# Patient Record
Sex: MALE | Race: BLACK OR AFRICAN AMERICAN | NOT HISPANIC OR LATINO | Employment: FULL TIME | URBAN - METROPOLITAN AREA
[De-identification: names, ages, dates, MRNs, and addresses within clinical notes are randomized per-mention and may not be internally consistent; named-entity substitution may affect disease eponyms.]

---

## 2017-11-03 ENCOUNTER — HOSPITAL ENCOUNTER (EMERGENCY)
Facility: HOSPITAL | Age: 37
Discharge: HOME | End: 2017-11-03
Attending: EMERGENCY MEDICINE
Payer: MEDICARE

## 2017-11-03 VITALS
SYSTOLIC BLOOD PRESSURE: 144 MMHG | RESPIRATION RATE: 18 BRPM | TEMPERATURE: 98 F | DIASTOLIC BLOOD PRESSURE: 85 MMHG | WEIGHT: 149 LBS | BODY MASS INDEX: 22.07 KG/M2 | HEIGHT: 69 IN | HEART RATE: 60 BPM | OXYGEN SATURATION: 100 %

## 2017-11-03 DIAGNOSIS — R07.89 CHEST WALL PAIN: Primary | ICD-10-CM

## 2017-11-03 DIAGNOSIS — R07.9 CHEST PAIN: ICD-10-CM

## 2017-11-03 PROCEDURE — 99284 EMERGENCY DEPT VISIT MOD MDM: CPT | Mod: 25

## 2017-11-03 PROCEDURE — 93010 ELECTROCARDIOGRAM REPORT: CPT | Mod: ,,, | Performed by: INTERNAL MEDICINE

## 2017-11-03 PROCEDURE — 93005 ELECTROCARDIOGRAM TRACING: CPT

## 2017-11-03 PROCEDURE — 99285 EMERGENCY DEPT VISIT HI MDM: CPT | Mod: GC,,, | Performed by: EMERGENCY MEDICINE

## 2017-11-03 PROCEDURE — 25000003 PHARM REV CODE 250: Performed by: EMERGENCY MEDICINE

## 2017-11-03 RX ORDER — NAPROXEN 500 MG/1
500 TABLET ORAL 2 TIMES DAILY WITH MEALS
Qty: 30 TABLET | Refills: 0 | Status: SHIPPED | OUTPATIENT
Start: 2017-11-03

## 2017-11-03 RX ORDER — NAPROXEN 500 MG/1
500 TABLET ORAL
Status: COMPLETED | OUTPATIENT
Start: 2017-11-03 | End: 2017-11-03

## 2017-11-03 RX ORDER — KETOROLAC TROMETHAMINE 30 MG/ML
10 INJECTION, SOLUTION INTRAMUSCULAR; INTRAVENOUS
Status: DISCONTINUED | OUTPATIENT
Start: 2017-11-03 | End: 2017-11-03

## 2017-11-03 RX ADMIN — NAPROXEN 500 MG: 500 TABLET ORAL at 11:11

## 2017-11-04 NOTE — ED NOTES
Patient identifiers verified and correct for Mr Jimenez  C/C: CP  APPEARANCE: awake and alert in NAD.  SKIN: warm, dry and intact. No breakdown or bruising.  MUSCULOSKELETAL: Patient moving all extremities spontaneously, no obvious swelling or deformities noted. Ambulates independently.  RESPIRATORY: Positive shortness of breath with deep breath..Respirations unlabored. No cough  CARDIAC: Denies CP, 2+ distal pulses; no peripheral edema  ABDOMEN: S/ND/NT, Denies nausea  : voids spontaneously, denies difficulty  Neurologic: AAO x 4; follows commands equal strength in all extremities; denies numbness/tingling. Denies dizziness

## 2017-11-04 NOTE — ED PROVIDER NOTES
Encounter Date: 11/3/2017    SCRIBE #1 NOTE: I, Soumya Alejandre, am scribing for, and in the presence of,  Dr. Guillen. I have scribed the following portions of the note - the EKG reading and the Resident attestation.       History     Chief Complaint   Patient presents with    Chest Pain     Pt reports chest pain since this AM, states it feels like something hit him in his chest. Slight SOB.     HPI   Pt had chest pain since about 10am this AM.  He was at work when it began.  He works doing demolition.  The chest pain is just to the left of the sternum.  The pain doesn't radiate anywhere.  It is reproducible when he pushes on it.  Denies fever, chills, N/V/D, trauma, abd pain, dysuria, flank pain, HA.    Has never had this before.  Thinks it may be gas or muscle strain.  He denies history of exertional angina.  Denies hx DVT or PE.  Denies recent cough, fever chills.      Family history of MI - her mother had an MI when she was 52.  No one else with heart disease.      Review of patient's allergies indicates:   Allergen Reactions    Penicillins      Past Medical History:   Diagnosis Date    Seizure      History reviewed. No pertinent surgical history.  History reviewed. No pertinent family history.  Social History   Substance Use Topics    Smoking status: Never Smoker    Smokeless tobacco: Never Used    Alcohol use No     Review of Systems   Cardiovascular: Positive for chest pain.   All other systems reviewed and are negative.      Physical Exam     Initial Vitals [11/03/17 2006]   BP Pulse Resp Temp SpO2   133/87 69 16 98.6 °F (37 °C) 99 %      MAP       102.33         Physical Exam  Gen/Constitutional: Interactive. No acute distress  Head: Normocephalic, Atraumatic  Neck: supple, no masses or LAD  Eyes: PERRLA, conjunctiva clear  Ears, Nose and Throat: No rhinorrhea or stridor.  Cardiac: Reg Rhythm, No murmur, left sided chest wall pain that is reproducible to palpation  Pulmonary: CTA Bilat, no wheezes,  rhonchi, rales.  GI: Abdomen soft, non-tender, non-distended; no rebound or guarding  : No CVA tenderness.  Musculoskeletal: Extremities warm, well perfused, no erythema, no edema  Skin: No rashes  Neuro: Alert and Oriented x 3; No focal motor or sensory deficits.    Psych: Normal affect    ED Course   Procedures  Labs Reviewed - No data to display     EKG Readings: (Independently Interpreted)   EKG: NSR, no ALCIRA's or STD's, non-specific twave pattern, no STEMI. Early repolarization pattern.             Medical Decision Making:   Independently Interpreted Test(s):   I have ordered and independently interpreted X-rays - see prior notes.  Clinical Tests:   Medical Tests: Ordered and Reviewed            Scribe Attestation:   Scribe #1: I performed the above scribed service and the documentation accurately describes the services I performed. I attest to the accuracy of the note.    Attending Attestation:   Physician Attestation Statement for Resident:  As the supervising MD   Physician Attestation Statement: I have personally seen and examined this patient.   I agree with the above history. -: Presentation consistent with noncardiac CP. Considered MI/ACS, PE, PTX, aortic dissection, and PNA among other diagnoses but ultimately felt these were unlikely based on hx and physical exam. Ultimately felt pt had chest wall pain. Given naproxen. Stable for discharge and outpatient follow up with PCP for cardiac risk stratification. The patient was given strict return precautions and follow up instructions and expressed understanding of these.  The patient felt comfortable with the plan for discharge and I did as well.  Stable for DC.       As the supervising MD I agree with the above PE.    As the supervising MD I agree with the above treatment, course, plan, and disposition.  I have reviewed and agree with the residents interpretation of the following: EKG.               Differential diagnosis includes but not limited to chest  wall pain due to musculoskeletal cause as reproducible on chest wall palpation. Low suspicion for MI, PE. Given naproxen here and prescription for it. Encouraged follow up with PCP. Discharged home with return precautions.  Charu Kennedy MD, PGY-4  11:21 PM    I, Dr. Abhishek Guillen, personally performed the services described in this documentation. All medical record entries made by the scribe were at my direction and in my presence.  I have reviewed the chart and agree that the record reflects my personal performance and is accurate and complete. Abhishek Guillen MD.  7:27 AM 11/04/2017            ED Course      Clinical Impression:   The primary encounter diagnosis was Chest wall pain. A diagnosis of Chest pain was also pertinent to this visit.    Disposition:   Disposition: Discharged  Condition: Stable                        Abhishek Guillen MD  11/04/17 0727

## 2019-03-22 ENCOUNTER — HOSPITAL ENCOUNTER (EMERGENCY)
Facility: HOSPITAL | Age: 39
Discharge: HOME | End: 2019-03-23
Attending: EMERGENCY MEDICINE
Payer: MEDICARE

## 2019-03-22 DIAGNOSIS — N30.90 CYSTITIS: Primary | ICD-10-CM

## 2019-03-22 DIAGNOSIS — R31.0 GROSS HEMATURIA: ICD-10-CM

## 2019-03-22 LAB
BACTERIA #/AREA URNS AUTO: ABNORMAL /HPF
BILIRUB UR QL STRIP: NEGATIVE
CLARITY UR REFRACT.AUTO: CLEAR
COLOR UR AUTO: YELLOW
GLUCOSE UR QL STRIP: NEGATIVE
HGB UR QL STRIP: ABNORMAL
KETONES UR QL STRIP: NEGATIVE
LEUKOCYTE ESTERASE UR QL STRIP: ABNORMAL
MICROSCOPIC COMMENT: ABNORMAL
NITRITE UR QL STRIP: NEGATIVE
PH UR STRIP: 5 [PH] (ref 5–8)
PROT UR QL STRIP: NEGATIVE
RBC #/AREA URNS AUTO: >100 /HPF (ref 0–4)
SP GR UR STRIP: 1.02 (ref 1–1.03)
SQUAMOUS #/AREA URNS AUTO: 1 /HPF
URN SPEC COLLECT METH UR: ABNORMAL
WBC #/AREA URNS AUTO: 29 /HPF (ref 0–5)

## 2019-03-22 PROCEDURE — 87491 CHLMYD TRACH DNA AMP PROBE: CPT

## 2019-03-22 PROCEDURE — 85025 COMPLETE CBC W/AUTO DIFF WBC: CPT

## 2019-03-22 PROCEDURE — 87086 URINE CULTURE/COLONY COUNT: CPT

## 2019-03-22 PROCEDURE — 99284 PR EMERGENCY DEPT VISIT,LEVEL IV: ICD-10-PCS | Mod: ,,, | Performed by: EMERGENCY MEDICINE

## 2019-03-22 PROCEDURE — 81001 URINALYSIS AUTO W/SCOPE: CPT

## 2019-03-22 PROCEDURE — 99284 EMERGENCY DEPT VISIT MOD MDM: CPT

## 2019-03-22 PROCEDURE — 80048 BASIC METABOLIC PNL TOTAL CA: CPT

## 2019-03-22 PROCEDURE — 99284 EMERGENCY DEPT VISIT MOD MDM: CPT | Mod: ,,, | Performed by: EMERGENCY MEDICINE

## 2019-03-23 VITALS
SYSTOLIC BLOOD PRESSURE: 129 MMHG | TEMPERATURE: 98 F | DIASTOLIC BLOOD PRESSURE: 78 MMHG | RESPIRATION RATE: 20 BRPM | HEART RATE: 70 BPM | OXYGEN SATURATION: 98 %

## 2019-03-23 LAB
ANION GAP SERPL CALC-SCNC: 10 MMOL/L
BASOPHILS # BLD AUTO: 0.04 K/UL
BASOPHILS NFR BLD: 0.6 %
BUN SERPL-MCNC: 18 MG/DL
CALCIUM SERPL-MCNC: 9.5 MG/DL
CHLORIDE SERPL-SCNC: 105 MMOL/L
CO2 SERPL-SCNC: 25 MMOL/L
CREAT SERPL-MCNC: 1.1 MG/DL
DIFFERENTIAL METHOD: NORMAL
EOSINOPHIL # BLD AUTO: 0.1 K/UL
EOSINOPHIL NFR BLD: 1.8 %
ERYTHROCYTE [DISTWIDTH] IN BLOOD BY AUTOMATED COUNT: 12.6 %
EST. GFR  (AFRICAN AMERICAN): >60 ML/MIN/1.73 M^2
EST. GFR  (NON AFRICAN AMERICAN): >60 ML/MIN/1.73 M^2
GLUCOSE SERPL-MCNC: 76 MG/DL
HCT VFR BLD AUTO: 42.7 %
HGB BLD-MCNC: 14.3 G/DL
IMM GRANULOCYTES # BLD AUTO: 0.01 K/UL
IMM GRANULOCYTES NFR BLD AUTO: 0.2 %
LYMPHOCYTES # BLD AUTO: 2.4 K/UL
LYMPHOCYTES NFR BLD: 38.7 %
MCH RBC QN AUTO: 30.3 PG
MCHC RBC AUTO-ENTMCNC: 33.5 G/DL
MCV RBC AUTO: 91 FL
MONOCYTES # BLD AUTO: 0.4 K/UL
MONOCYTES NFR BLD: 7.1 %
NEUTROPHILS # BLD AUTO: 3.2 K/UL
NEUTROPHILS NFR BLD: 51.6 %
NRBC BLD-RTO: 0 /100 WBC
PLATELET # BLD AUTO: 308 K/UL
PMV BLD AUTO: 9.7 FL
POTASSIUM SERPL-SCNC: 4.2 MMOL/L
RBC # BLD AUTO: 4.72 M/UL
SODIUM SERPL-SCNC: 140 MMOL/L
WBC # BLD AUTO: 6.2 K/UL

## 2019-03-23 RX ORDER — SULFAMETHOXAZOLE AND TRIMETHOPRIM 800; 160 MG/1; MG/1
1 TABLET ORAL 2 TIMES DAILY
Qty: 14 TABLET | Refills: 0 | Status: SHIPPED | OUTPATIENT
Start: 2019-03-23 | End: 2019-03-30

## 2019-03-23 NOTE — ED TRIAGE NOTES
Patient presents to the ED with complaints of visible blood in his urine, burning with urination and lower abdominal pain x 1 day. Patient denies any penile discharge. Patient denies any nausea, fevers, chills.

## 2019-03-23 NOTE — DISCHARGE INSTRUCTIONS
Take your antibiotic as prescribed.  Follow up with your doctor. Call on Monday for an appointment in 2 weeks.  Return to ED for fevers, abdominal or flank pain, vomiting, lightheadedness or any other concerns.

## 2019-03-23 NOTE — ED PROVIDER NOTES
Encounter Date: 3/22/2019    SCRIBE #1 NOTE: I, Elishasusan Wallace, am scribing for, and in the presence of,  Dr. Swain. I have scribed the entire note.       History     Chief Complaint   Patient presents with    Hematuria     c/o hematuria and dysuria x 1 day.      Time patient was seen by the provider: 10:54 PM      The patient is a 38 y.o. male with no signfiicant co-morbidities  who presents to the ED with a complaint of hematuria, dysuria, and suprapubic abd pain for one day. He noted bright red blood in the toilet after urinating this afternoon. He is not sexually active, has had no anal penetration, and has no history of STDs. Denies flank pain, fevers, nausea, emesis, diarrhea, hematochezia, CP, SOB, HA, or recent trauma/injury to the area. No penile drip.        The history is provided by the patient.     Review of patient's allergies indicates:   Allergen Reactions    Penicillins      Past Medical History:   Diagnosis Date    Seizure      History reviewed. No pertinent surgical history.  History reviewed. No pertinent family history.  Social History     Tobacco Use    Smoking status: Never Smoker    Smokeless tobacco: Never Used   Substance Use Topics    Alcohol use: No    Drug use: No     Review of Systems   Constitutional: Negative for fever.   HENT: Negative for nosebleeds.    Eyes: Negative for visual disturbance.   Respiratory: Negative for shortness of breath.    Cardiovascular: Negative for chest pain.   Gastrointestinal: Positive for abdominal pain (suprapubic ). Negative for blood in stool, constipation, nausea and vomiting.   Genitourinary: Positive for dysuria and hematuria. Negative for flank pain.   Musculoskeletal: Negative for gait problem.   Skin: Negative for wound.   Neurological: Negative for headaches.   Hematological: Does not bruise/bleed easily.   Psychiatric/Behavioral: Negative for confusion.       Physical Exam     Initial Vitals [03/22/19 2150]   BP Pulse Resp Temp SpO2    134/80 69 18 97.9 °F (36.6 °C) 99 %      MAP       --         Physical Exam    Nursing note and vitals reviewed.  Constitutional: He appears well-developed and well-nourished.   HENT:   Head: Normocephalic and atraumatic.   Eyes: EOM are normal.   Neck: Normal range of motion. Neck supple.   Cardiovascular: Normal rate, regular rhythm and normal heart sounds. Exam reveals no gallop and no friction rub.    No murmur heard.  Pulmonary/Chest: Breath sounds normal. No respiratory distress.   Abdominal: Soft. He exhibits no distension. There is no tenderness.   Genitourinary:   Genitourinary Comments: Normal external male genitalia. Uncircumcised. Retraction of foreskin revealed normal ureteral meatus with minimal amount of blood at meatus. No penile lesions, no inguinal lymphadenopathy.    Musculoskeletal: He exhibits no edema or tenderness.   Neurological: He is alert and oriented to person, place, and time. He has normal strength. GCS score is 15. GCS eye subscore is 4. GCS verbal subscore is 5. GCS motor subscore is 6.   Skin: Skin is warm and dry.         ED Course   Procedures  Labs Reviewed   URINALYSIS, REFLEX TO URINE CULTURE - Abnormal; Notable for the following components:       Result Value    Occult Blood UA 3+ (*)     Leukocytes, UA 1+ (*)     All other components within normal limits    Narrative:     YELLOW AND GRAY TUBES  Preferred Collection Type->Urine, Clean Catch   URINALYSIS MICROSCOPIC - Abnormal; Notable for the following components:    RBC, UA >100 (*)     WBC, UA 29 (*)     All other components within normal limits    Narrative:     YELLOW AND GRAY TUBES  Preferred Collection Type->Urine, Clean Catch   C. TRACHOMATIS/N. GONORRHOEAE BY AMP DNA   CULTURE, URINE   CBC W/ AUTO DIFFERENTIAL   BASIC METABOLIC PANEL          Imaging Results          CT Renal Stone Study ABD Pelvis WO (Final result)  Result time 03/23/19 01:08:31    Final result by Gilberto Montenegro MD (03/23/19 01:08:31)                  Impression:      No nephrolithiasis or obstructive uropathy.    Mild diffuse bladder wall thickening which is nonspecific may be related to cystitis or recently passed stone in the setting of hematuria.  Recommend clinical correlation with urinalysis.  Consider urology referral if hematuria persists.    Electronically signed by resident: Corey Villasenor  Date:    03/23/2019  Time:    00:39    Electronically signed by: Gilberto Montenegro MD  Date:    03/23/2019  Time:    01:08             Narrative:    EXAMINATION:  CT RENAL STONE STUDY ABD PELVIS WO    CLINICAL HISTORY:  Hematuria;    TECHNIQUE:  The patient was surveyed from the lung bases through the pelvis without the administration of contrast.  The data was reconstructed for coronal, sagittal, and axial images.    COMPARISON:  None.    FINDINGS:  The lung bases are clear.  No pleural effusion is seen.    The visualized portions of the heart appear normal. No pericardial effusion.    The liver is normal in size and attenuation.  No focal hepatic abnormality is seen. The gallbladder is unremarkable.  No intrahepatic or extrahepatic biliary ductal dilatation is identified. The spleen is unremarkable.    The stomach, pancreas, and adrenal glands are unremarkable.    The kidneys are normal in size and location. No hydronephrosis is seen.  The ureters appear normal in course and caliber. The urinary bladder demonstrates mild diffuse wall thickening.  The prostate is normal.  Few pelvic phleboliths noted.    The visualized loops of small and large bowel show no evidence of obstruction or inflammation.  The appendix is normal.    No ascites, free fluid, or intraperitoneal free air is identified.  No mesenteric, retroperitoneal, or inguinal lymph node enlargement.  The abdominal aorta is normal in course and caliber without significant atherosclerotic calcifications.    The osseous structures demonstrate no significant abnormality.   The extraperitoneal soft tissues  are unremarkable.                                 Medical Decision Making:   History:   Old Medical Records: I decided to obtain old medical records.  Old Records Summarized: records from previous admission(s).  Initial Assessment:   39 y/o M with gross hematuria. No flank pain. No trauma. No penile discharge.  Ddx: cystitis, UTI, STI, less likely kidney stone, bladder mass  Routine blood work as well as UA  GC/Ch- will not treat presumptively  CT renal stone protocal  Clinical Tests:   Lab Tests: Ordered and Reviewed  Radiological Study: Ordered and Reviewed  ED Management:  1:13 AM  UA >100 rbc, 29 wbc, 1+LE and rare bacteria  Normal wbc and hgb  CTscan  No nephrolithiasis or obstructive uropathy. Mild diffuse bladder wall thickening which is nonspecific may be related to cystitis or recently passed stone in the setting of hematuria.    Will treat as UTI and have pt follow up with PCP for repeat UA in 2 weeks. If hematuria persists referral to urology.  Pt is to be called if GC/Ch are positive.  Routine return precautions provided.              Scribe Attestation:   Scribe #1: I performed the above scribed service and the documentation accurately describes the services I performed. I attest to the accuracy of the note.               Clinical Impression:       ICD-10-CM ICD-9-CM   1. Cystitis N30.90 595.9   2. Gross hematuria R31.0 599.71         Disposition:   Disposition: Discharged  Condition: Stable                        Yadi Swain MD  03/23/19 3718

## 2019-03-23 NOTE — ED NOTES
Patient identifiers verified and correct for Kevin Jimenez.  C/C: Hematuria  APPEARANCE: awake and alert in NAD.  SKIN: warm, dry and intact. No breakdown or bruising.  MUSCULOSKELETAL: Patient moving all extremities spontaneously, no obvious swelling or deformities noted. Ambulates independently.  RESPIRATORY: Denies shortness of breath. Respirations unlabored.   CARDIAC: Denies CP, 2+ distal pulses; no peripheral edema  ABDOMEN: S/ND. Denies nausea.  : voids spontaneously. +Hematuria +Burning with urination  Neurologic: AAO x 4; follows commands equal strength in all extremities; denies numbness/tingling. Denies dizziness or weakness.

## 2019-03-24 LAB — BACTERIA UR CULT: NORMAL

## 2019-03-25 LAB
C TRACH DNA SPEC QL NAA+PROBE: NOT DETECTED
N GONORRHOEA DNA SPEC QL NAA+PROBE: NOT DETECTED

## 2023-12-27 ENCOUNTER — HOSPITAL ENCOUNTER (EMERGENCY)
Facility: HOSPITAL | Age: 43
Discharge: HOME | End: 2023-12-27
Attending: EMERGENCY MEDICINE
Payer: MEDICARE

## 2023-12-27 VITALS
TEMPERATURE: 100 F | BODY MASS INDEX: 28.41 KG/M2 | RESPIRATION RATE: 16 BRPM | HEART RATE: 89 BPM | DIASTOLIC BLOOD PRESSURE: 66 MMHG | OXYGEN SATURATION: 100 % | SYSTOLIC BLOOD PRESSURE: 123 MMHG | HEIGHT: 69 IN | WEIGHT: 191.81 LBS

## 2023-12-27 DIAGNOSIS — J11.1 INFLUENZA: Primary | ICD-10-CM

## 2023-12-27 DIAGNOSIS — R05.9 COUGH: ICD-10-CM

## 2023-12-27 LAB
INFLUENZA A, MOLECULAR: NEGATIVE
INFLUENZA B, MOLECULAR: POSITIVE
SARS-COV-2 RDRP RESP QL NAA+PROBE: NEGATIVE
SPECIMEN SOURCE: ABNORMAL

## 2023-12-27 PROCEDURE — U0002 COVID-19 LAB TEST NON-CDC: HCPCS

## 2023-12-27 PROCEDURE — 99283 EMERGENCY DEPT VISIT LOW MDM: CPT | Mod: 25

## 2023-12-27 PROCEDURE — 87502 INFLUENZA DNA AMP PROBE: CPT

## 2023-12-27 PROCEDURE — 25000003 PHARM REV CODE 250

## 2023-12-27 RX ORDER — ACETAMINOPHEN 325 MG/1
650 TABLET ORAL
Status: COMPLETED | OUTPATIENT
Start: 2023-12-27 | End: 2023-12-27

## 2023-12-27 RX ADMIN — ACETAMINOPHEN 650 MG: 325 TABLET ORAL at 12:12

## 2023-12-27 NOTE — DISCHARGE INSTRUCTIONS
You tested positive for influenza B  Antibiotics are not effective against viruses.  Continue symptom management with Tylenol or TheraFlu.  [place] diet.  Increase hydration.  Wear mask around others.

## 2023-12-27 NOTE — ED TRIAGE NOTES
Pt arrives to ED via PV stating he has had flu like symptoms since Sunday. Pt states he has had a headache, chills, fever, and cough. Pt states he has been taking tylenol for the fever.

## 2023-12-27 NOTE — ED PROVIDER NOTES
Encounter Date: 12/27/2023       History     Chief Complaint   Patient presents with    flu symptoms     Body aches, ha,      43-year-old male no significant medical history presents to emergency department due to fevers, myalgias and nonproductive cough since Sunday.  Patient reports generally feeling unwell.  He has been taking DayQuil and Tylenol for fevers at home.  He was not experiencing nausea or vomiting. No diarrhea.      Review of patient's allergies indicates:   Allergen Reactions    Penicillins      Past Medical History:   Diagnosis Date    Seizure      No past surgical history on file.  No family history on file.  Social History     Tobacco Use    Smoking status: Never    Smokeless tobacco: Never   Substance Use Topics    Alcohol use: No    Drug use: No     Review of Systems  See HPI  Physical Exam     Initial Vitals [12/27/23 1120]   BP Pulse Resp Temp SpO2   (!) 148/85 102 18 (!) 100.9 °F (38.3 °C) 98 %      MAP       --         Physical Exam    Constitutional: He appears well-developed.   HENT:   Head: Normocephalic and atraumatic.   Eyes: Conjunctivae and EOM are normal.   Neck:   Normal range of motion.  Cardiovascular:  Normal rate and normal heart sounds.           Pulmonary/Chest: Breath sounds normal. No respiratory distress. He has no wheezes. He has no rales.   Abdominal: Abdomen is soft. He exhibits no distension. There is no abdominal tenderness. There is no rebound.   Musculoskeletal:         General: Normal range of motion.      Cervical back: Normal range of motion.     Neurological: He is alert and oriented to person, place, and time.   Skin: Skin is warm and dry.   Psychiatric: He has a normal mood and affect. Thought content normal.         ED Course   Procedures  Labs Reviewed   INFLUENZA A & B BY MOLECULAR - Abnormal; Notable for the following components:       Result Value    Influenza B, Molecular Positive (*)     All other components within normal limits   SARS-COV-2 RNA  AMPLIFICATION, QUAL          Imaging Results              X-Ray Chest PA And Lateral (Final result)  Result time 12/27/23 13:00:21      Final result by Navarro Sepulveda MD (12/27/23 13:00:21)                   Impression:      See above      Electronically signed by: Navarro Sepulveda MD  Date:    12/27/2023  Time:    13:00               Narrative:    EXAMINATION:  XR CHEST PA AND LATERAL    CLINICAL HISTORY:  Cough, unspecified    TECHNIQUE:  PA and lateral views of the chest were performed.    COMPARISON:  None    FINDINGS:  Heart size normal.  The lungs are clear.  No pleural effusion                                       Medications   acetaminophen tablet 650 mg (650 mg Oral Given 12/27/23 1239)     Medical Decision Making  43-year-old male she presents emergency department due to fever and myalgias.  Differential diagnosis COVID, influenza lower suspicion for pneumonia.  CXR without acute findings, patient was influenza B positive.  Discussed treatment for viral syndrome patient verbalized understanding Plan discharged home    Amount and/or Complexity of Data Reviewed  Labs: ordered.  Radiology: ordered.    Risk  OTC drugs.                                      Clinical Impression:  Final diagnoses:  [R05.9] Cough  [J11.1] Influenza (Primary)          ED Disposition Condition    Discharge Stable          ED Prescriptions    None       Follow-up Information       Follow up With Specialties Details Why Contact Info    Kelli Irving MD Dermatology   [address]  [address]  [place] [address]  [phone]               Carri Gee PA-C  12/28/23 1410       Carri Gee PA-C  12/28/23 1410

## 2024-10-01 ENCOUNTER — HOSPITAL ENCOUNTER (EMERGENCY)
Facility: HOSPITAL | Age: 44
Discharge: HOME | End: 2024-10-01
Attending: EMERGENCY MEDICINE
Payer: MEDICARE

## 2024-10-01 VITALS
WEIGHT: 149 LBS | OXYGEN SATURATION: 98 % | HEIGHT: 69 IN | RESPIRATION RATE: 16 BRPM | HEART RATE: 72 BPM | SYSTOLIC BLOOD PRESSURE: 130 MMHG | TEMPERATURE: 98 F | BODY MASS INDEX: 22.07 KG/M2 | DIASTOLIC BLOOD PRESSURE: 76 MMHG

## 2024-10-01 DIAGNOSIS — S63.501A SPRAIN OF RIGHT WRIST, INITIAL ENCOUNTER: Primary | ICD-10-CM

## 2024-10-01 PROCEDURE — 99284 EMERGENCY DEPT VISIT MOD MDM: CPT

## 2024-10-01 RX ORDER — ONDANSETRON 4 MG/1
4 TABLET, ORALLY DISINTEGRATING ORAL EVERY 6 HOURS PRN
Qty: 10 TABLET | Refills: 0 | Status: SHIPPED | OUTPATIENT
Start: 2024-10-01

## 2024-10-01 RX ORDER — OXYCODONE AND ACETAMINOPHEN 5; 325 MG/1; MG/1
1 TABLET ORAL EVERY 6 HOURS PRN
Qty: 12 TABLET | Refills: 0 | Status: SHIPPED | OUTPATIENT
Start: 2024-10-01

## 2024-10-01 NOTE — ED TRIAGE NOTES
Kevin Jimenez, a 43 y.o. male presents to the ED w/ complaint of right wrist pain for about 4 days denies injury    Triage note:  Chief Complaint   Patient presents with    Wrist Pain     R wrist pain x4 days. Denies injury, woke up with pain. Sensation and ROM intact.     Review of patient's allergies indicates:   Allergen Reactions    Penicillins      Past Medical History:   Diagnosis Date    Seizure          APPEARANCE: awake and alert in NAD. PAIN  8/10  SKIN: warm, dry and intact. No breakdown or bruising.  MUSCULOSKELETAL: Patient moving all extremities spontaneously, no obvious swelling or deformities noted. Ambulates independently.  RESPIRATORY: Denies shortness of breath.Respirations unlabored.   CARDIAC: Denies CP, 2+ distal pulses; no peripheral edema  ABDOMEN: S/ND/NT, Denies nausea  : voids spontaneously, denies difficulty  Neurologic: AAO x 4; follows commands equal strength in all extremities; denies numbness/tingling. Denies dizziness

## 2024-10-01 NOTE — ED PROVIDER NOTES
Emergency Department Provider Note    Kevin Jimenez   43 y.o. male   [id number]      10/1/2024       History     This history was obtained from the patient without limitations.  He presented by personal transportation.      He is a 43-year-old with a past medical history of seizure disorder who complains of acute pain to his right wrist that began 4-5 days ago.  He is left-handed.  He denies blunt trauma to his right hand and wrist.  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  He denies weakness and numbness throughout the right upper extremity.  He denies fever, chills, nausea, and vomiting.  He took ibuprofen 200 mg last night which provided no relief.         Past Medical History:   Diagnosis Date    Seizure       History reviewed. No pertinent surgical history.   No family history on file.   Social History     Socioeconomic History    Marital status: Single   Tobacco Use    Smoking status: Never    Smokeless tobacco: Never   Substance and Sexual Activity    Alcohol use: No    Drug use: No    Sexual activity: Yes     Partners: Female     Birth control/protection: Condom      Review of patient's allergies indicates:   Allergen Reactions    Penicillins            Physical Examination     Initial Vitals [10/01/24 1747]   BP Pulse Resp Temp SpO2   133/84 78 18 98 °F (36.7 °C) 99 %      MAP       --           Physical Exam    Nursing note and vitals reviewed.  Constitutional: He is not diaphoretic. No distress.   Cardiovascular:            Pulses:       Radial pulses are 2+ on the right side.   Musculoskeletal:      Right wrist: Swelling and tenderness present. No snuff box tenderness or crepitus. Decreased range of motion.      Right hand: No swelling, deformity or tenderness. Normal range of motion.      Comments: Right wrist: Joint does not feel hot to the touch     Neurological:   Normal touch sensation throughout the right hand  and wrist.   Skin: Skin is warm and dry. No pallor.   No erythema, rash, discoloration overlying the right wrist and hand            Labs     None     Imaging     Imaging Results    None           ED Course     The patient received the following medications:  None              Medical Decision Making                 Medical Decision Making            Diagnoses       ICD-10-CM ICD-9-CM   1. Sprain of right wrist, initial encounter  S63.501A 842.00         Dispostion      ED Disposition Condition    Discharge Stable            ED Prescriptions       Medication Sig Dispense Start Date End Date Auth. Provider    oxyCODONE-acetaminophen (PERCOCET) 5-325 mg per tablet Take 1 tablet by mouth every 6 (six) hours as needed (moderate to severe pain). 12 tablet 10/1/2024 -- Vineet Horvath III, MD    ondansetron (ZOFRAN-ODT) 4 MG TbDL Take 1 tablet (4 mg total) by mouth every 6 (six) hours as needed (nausea). 10 tablet 10/1/2024 -- Vineet Horvath III, MD            Follow-up Information       Follow up With Specialties Details Why Contact Info    Kelli Irving MD Dermatology In 1 week This is a follow-up for today's ER visit. [address] [facility]  [address]  [place] [address]  [phone]      ER   Return to the ER if your pain worsens, if you develop fever and/or chills, if you notice discoloration of the skin overlying the right hand or wrist, or return for any other concerns needing immediate attention as determined by you.               Vineet Horvath III, MD  10/01/24 9798

## 2024-10-01 NOTE — DISCHARGE INSTRUCTIONS
For pain:  Take Tylenol 650 mg every 4-6 hours. Also take ibuprofen 600-800 mg every 8 hours.  Take these medications on a scheduled for the next 5 days and then as needed thereafter. Take the prescribed pain medication for moderate to severe pain not controlled with over-the-counter medications.    We know that you have many choices and are honored that you chose us. We hope that we met or exceeded your expectations and goals for this visit and will keep the [facility] family in mind for your future needs and those of your family and friends.     - Dr. Horvath

## 2024-11-01 DIAGNOSIS — M79.601 PAIN OF RIGHT UPPER EXTREMITY: Primary | ICD-10-CM

## 2024-11-02 ENCOUNTER — HOSPITAL ENCOUNTER (OUTPATIENT)
Dept: RADIOLOGY | Facility: HOSPITAL | Age: 44
Discharge: HOME | End: 2024-11-02
Attending: STUDENT IN AN ORGANIZED HEALTH CARE EDUCATION/TRAINING PROGRAM
Payer: MEDICARE

## 2024-11-02 DIAGNOSIS — M79.601 PAIN OF RIGHT UPPER EXTREMITY: ICD-10-CM

## 2024-11-02 PROCEDURE — 72040 X-RAY EXAM NECK SPINE 2-3 VW: CPT | Mod: TC

## 2024-11-02 PROCEDURE — 73110 X-RAY EXAM OF WRIST: CPT | Mod: TC,RT

## 2024-11-02 PROCEDURE — 72040 X-RAY EXAM NECK SPINE 2-3 VW: CPT | Mod: 26,,, | Performed by: RADIOLOGY

## 2024-11-02 PROCEDURE — 73110 X-RAY EXAM OF WRIST: CPT | Mod: 26,RT,, | Performed by: RADIOLOGY

## 2025-01-13 DIAGNOSIS — M79.2 NEUROPATHIC PAIN OF UPPER EXTREMITY: Primary | ICD-10-CM

## 2025-03-28 ENCOUNTER — HOSPITAL ENCOUNTER (EMERGENCY)
Facility: HOSPITAL | Age: 45
Discharge: HOME | End: 2025-03-28
Attending: STUDENT IN AN ORGANIZED HEALTH CARE EDUCATION/TRAINING PROGRAM
Payer: MEDICARE

## 2025-03-28 VITALS
DIASTOLIC BLOOD PRESSURE: 87 MMHG | RESPIRATION RATE: 18 BRPM | HEIGHT: 69 IN | OXYGEN SATURATION: 100 % | HEART RATE: 60 BPM | BODY MASS INDEX: 20.73 KG/M2 | WEIGHT: 140 LBS | TEMPERATURE: 98 F | SYSTOLIC BLOOD PRESSURE: 148 MMHG

## 2025-03-28 DIAGNOSIS — N30.01 ACUTE CYSTITIS WITH HEMATURIA: ICD-10-CM

## 2025-03-28 DIAGNOSIS — R31.9 HEMATURIA, UNSPECIFIED TYPE: Primary | ICD-10-CM

## 2025-03-28 LAB
BACTERIA #/AREA URNS AUTO: ABNORMAL /HPF
BILIRUB UR QL STRIP.AUTO: NEGATIVE
BUN SERPL-MCNC: 15 MG/DL (ref 6–30)
CHLORIDE SERPL-SCNC: 104 MMOL/L (ref 95–110)
CLARITY UR: CLEAR
COLOR UR AUTO: YELLOW
CREAT SERPL-MCNC: 1.4 MG/DL (ref 0.5–1.4)
GLUCOSE SERPL-MCNC: 95 MG/DL (ref 70–110)
GLUCOSE UR QL STRIP: NEGATIVE
HCT VFR BLD CALC: 45 %PCV (ref 36–54)
HCV AB SERPL QL IA: NORMAL
HGB UR QL STRIP: ABNORMAL
HIV 1+2 AB+HIV1 P24 AG SERPL QL IA: NORMAL
KETONES UR QL STRIP: NEGATIVE
LEUKOCYTE ESTERASE UR QL STRIP: ABNORMAL
MICROSCOPIC COMMENT: ABNORMAL
NITRITE UR QL STRIP: NEGATIVE
PH UR STRIP: 6 [PH]
POC IONIZED CALCIUM: 1.2 MMOL/L (ref 1.06–1.42)
POC TCO2 (MEASURED): 26 MMOL/L (ref 23–29)
POTASSIUM BLD-SCNC: 3.7 MMOL/L (ref 3.5–5.1)
PROT UR QL STRIP: NEGATIVE
RBC #/AREA URNS AUTO: >100 /HPF (ref 0–4)
SAMPLE: NORMAL
SODIUM BLD-SCNC: 139 MMOL/L (ref 136–145)
SP GR UR STRIP: 1.02
SQUAMOUS #/AREA URNS AUTO: 1 /HPF
UROBILINOGEN UR STRIP-ACNC: ABNORMAL EU/DL
WBC #/AREA URNS AUTO: 33 /HPF (ref 0–5)

## 2025-03-28 PROCEDURE — 80047 BASIC METABLC PNL IONIZED CA: CPT

## 2025-03-28 PROCEDURE — 81001 URINALYSIS AUTO W/SCOPE: CPT | Performed by: EMERGENCY MEDICINE

## 2025-03-28 PROCEDURE — 86803 HEPATITIS C AB TEST: CPT | Performed by: PHYSICIAN ASSISTANT

## 2025-03-28 PROCEDURE — 87389 HIV-1 AG W/HIV-1&-2 AB AG IA: CPT | Performed by: PHYSICIAN ASSISTANT

## 2025-03-28 PROCEDURE — 99283 EMERGENCY DEPT VISIT LOW MDM: CPT

## 2025-03-28 PROCEDURE — 87086 URINE CULTURE/COLONY COUNT: CPT | Performed by: EMERGENCY MEDICINE

## 2025-03-28 RX ORDER — SULFAMETHOXAZOLE AND TRIMETHOPRIM 800; 160 MG/1; MG/1
1 TABLET ORAL 2 TIMES DAILY
Qty: 14 TABLET | Refills: 0 | Status: SHIPPED | OUTPATIENT
Start: 2025-03-28 | End: 2025-04-04

## 2025-03-29 NOTE — ED TRIAGE NOTES
Patient identifiers for Kevin Jimenez 44 y.o. male checked and correct.  Chief Complaint   Patient presents with    Hematuria     X 1 day     Past Medical History:   Diagnosis Date    Seizure      Allergies reported:   Review of patient's allergies indicates:   Allergen Reactions    Penicillins          LOC: Patient is awake, alert, and aware of environment with an appropriate affect. Patient is oriented x 4 and speaking appropriately.  APPEARANCE: Patient resting comfortably and in no acute distress. Patient is clean and well groomed, patient's clothing is properly fastened.  HEENT:   SKIN: The skin is warm and dry. Patient has normal skin turgor and moist mucus membranes.   MUSKULOSKELETAL: Patient is moving all extremities well, no obvious deformities noted. Pulses intact.   RESPIRATORY: Airway is open and patent. Respirations are spontaneous and non-labored with normal effort and rate.  CARDIAC: Patient has a normal rate and rhythm. ** on cardiac monitor. No peripheral edema noted.   ABDOMEN: No distention noted. Soft and non-tender upon palpation.  NEUROLOGICAL: pupils **mm, PERRL. Facial expression is symmetrical. Hand grasps are equal bilaterally. Normal sensation in all extremities when touched with finger.

## 2025-03-29 NOTE — DISCHARGE INSTRUCTIONS
It was a pleasure taking care of you today!     Diagnosis: Urinary Tract Infection with Hematuria    Home Care:   - Take the antibiotic as prescribed    Follow-Up Plan:  - Follow up with Urology. They should contact you to schedule an appointment or you may call the number below  - Follow-up with primary care doctor within 3 - 5 days to discuss your recent ER visit and any additional concerns that you may have.  - Additional testing and/or evaluation as directed by your primary doctor    Return to the Emergency Department for symptoms including but not limited to: persistence or worsening of symptoms, shortness of breath or chest pain, inability to drink without vomiting, passing out/fainting/ loss of consciousness, or if you have other concerns.

## 2025-03-29 NOTE — FIRST PROVIDER EVALUATION
Medical screening examination initiated.  I have conducted a focused provider triage encounter, findings are as follows:    Brief history of present illness:  Blood in urine    There were no vitals filed for this visit.    Pertinent physical exam:  nl exam    Brief workup plan:  UA and Istat chemistry    Preliminary workup initiated; this workup will be continued and followed by the physician or advanced practice provider that is assigned to the patient when roomed.

## 2025-03-29 NOTE — ED PROVIDER NOTES
Encounter Date: 3/28/2025       History     Chief Complaint   Patient presents with    Hematuria     X 1 day     44-year-old male presents with hematuria.  Patient states that starting yesterday he noticed some blood in his urine.  It usually appears towards the end of his stream.  He reports having similar symptoms about a month ago that self-resolved, so he did not see anyone.  Denies dysuria, abdominal pain, nausea, vomiting, fever, diarrhea, constipation, and black/bloody stools. Denies penile discharge. ROS otherwise negative.     The history is provided by the patient.     Review of patient's allergies indicates:   Allergen Reactions    Penicillins      Past Medical History:   Diagnosis Date    Seizure      History reviewed. No pertinent surgical history.  No family history on file.  Social History[1]  Review of Systems    Physical Exam     Initial Vitals [03/28/25 2041]   BP Pulse Resp Temp SpO2   (!) 159/94 (!) 57 18 98.2 °F (36.8 °C) 100 %      MAP       --         Physical Exam    Constitutional: Vital signs are normal. He appears well-developed and well-nourished.   HENT:   Head: Normocephalic and atraumatic.   Eyes: Conjunctivae are normal.   Cardiovascular:  Normal rate and intact distal pulses.           Abdominal: Abdomen is soft. He exhibits no distension. There is no abdominal tenderness. There is no rebound and no guarding.     Neurological: He is alert. GCS eye subscore is 4. GCS verbal subscore is 5. GCS motor subscore is 6.         ED Course   Procedures  Labs Reviewed   URINALYSIS, REFLEX TO URINE CULTURE - Abnormal       Result Value    Color, UA Yellow      Appearance, UA Clear      pH, UA 6.0      Spec Grav UA 1.020      Protein, UA Negative      Glucose, UA Negative      Ketones, UA Negative      Bilirubin, UA Negative      Blood, UA 3+ (*)     Nitrites, UA Negative      Urobilinogen, UA 2.0-3.0 (*)     Leukocyte Esterase, UA 2+ (*)    URINALYSIS MICROSCOPIC - Abnormal    RBC, UA >100 (*)      WBC, UA 33 (*)     Bacteria, UA Few (*)     Squamous Epithelial Cells, UA 1      Microscopic Comment       HEPATITIS C ANTIBODY - Normal    Hep C Ab Interp Non-Reactive     HIV 1 / 2 ANTIBODY - Normal    HIV 1/2 Ag/Ab Non-Reactive     CULTURE, URINE   ISTAT PROCEDURE    POC Glucose 95      POC BUN 15      POC Creatinine 1.4      POC Sodium 139      POC Potassium 3.7      POC Chloride 104      POC TCO2 (MEASURED) 26      POC Ionized Calcium 1.20      POC Hematocrit 45      Sample RANJANA     ISTAT CHEM8          Imaging Results    None          Medications - No data to display  Medical Decision Making  Differential diagnoses considered includes UTI, pyelonephritis, nephrolithiasis, BPH, neoplasm    See ED course for additional attending MDM.    Amount and/or Complexity of Data Reviewed  Labs: ordered. Decision-making details documented in ED Course.    Risk  Prescription drug management.               ED Course as of 03/28/25 2319   Fri Mar 28, 2025   2309 Urinalysis, Reflex to Urine Culture Urine, Clean Catch(!)  UTI. Will treat with Keflex [BD]   2310 ISTAT PROCEDURE  Normal renal function and electrolytes [BD]   2310 Will rx keflex for UTI. Referred to Urology for hematuria and repeat UA. Patient will be discharged at this time. Patient has been given home care instructions, follow up instructions, and strict return precautions. They agree with and are comfortable with the plan.  [BD]      ED Course User Index  [BD] Eric Root MD                           Clinical Impression:  Final diagnoses:  [R31.9] Hematuria, unspecified type (Primary)  [N30.01] Acute cystitis with hematuria          ED Disposition Condition    Discharge Stable          ED Prescriptions       Medication Sig Dispense Start Date End Date Auth. Provider    sulfamethoxazole-trimethoprim 800-160mg (BACTRIM DS) 800-160 mg Tab Take 1 tablet by mouth 2 (two) times daily. for 7 days 14 tablet 3/28/2025 4/4/2025 Eric Root MD           Follow-up Information       Follow up With Specialties Details Why Contact Info    [facility]   [address]  [facility] [id number] [phone]                 [1]   Social History  Tobacco Use    Smoking status: Never    Smokeless tobacco: Never   Substance Use Topics    Alcohol use: No    Drug use: No        Eric Root MD  03/28/25 3062

## 2025-04-01 ENCOUNTER — RESULTS FOLLOW-UP (OUTPATIENT)
Dept: HEPATOLOGY | Facility: HOSPITAL | Age: 45
End: 2025-04-01

## 2025-04-03 LAB — BACTERIA UR CULT: ABNORMAL
